# Patient Record
Sex: MALE | Race: WHITE | NOT HISPANIC OR LATINO
[De-identification: names, ages, dates, MRNs, and addresses within clinical notes are randomized per-mention and may not be internally consistent; named-entity substitution may affect disease eponyms.]

---

## 2019-11-25 ENCOUNTER — APPOINTMENT (OUTPATIENT)
Dept: CARDIOLOGY | Facility: CLINIC | Age: 59
End: 2019-11-25
Payer: COMMERCIAL

## 2019-11-25 PROCEDURE — 99214 OFFICE O/P EST MOD 30 MIN: CPT

## 2019-11-25 PROCEDURE — 93000 ELECTROCARDIOGRAM COMPLETE: CPT

## 2020-01-23 ENCOUNTER — APPOINTMENT (OUTPATIENT)
Dept: CARDIOLOGY | Facility: CLINIC | Age: 60
End: 2020-01-23

## 2020-02-03 ENCOUNTER — APPOINTMENT (OUTPATIENT)
Dept: CARDIOLOGY | Facility: CLINIC | Age: 60
End: 2020-02-03
Payer: COMMERCIAL

## 2020-02-03 PROCEDURE — 93306 TTE W/DOPPLER COMPLETE: CPT

## 2020-02-24 ENCOUNTER — APPOINTMENT (OUTPATIENT)
Dept: CARDIOLOGY | Facility: CLINIC | Age: 60
End: 2020-02-24
Payer: COMMERCIAL

## 2020-02-24 PROCEDURE — 93015 CV STRESS TEST SUPVJ I&R: CPT

## 2020-05-11 ENCOUNTER — OUTPATIENT (OUTPATIENT)
Dept: OUTPATIENT SERVICES | Facility: HOSPITAL | Age: 60
LOS: 1 days | Discharge: HOME | End: 2020-05-11
Payer: COMMERCIAL

## 2020-05-11 ENCOUNTER — RESULT REVIEW (OUTPATIENT)
Age: 60
End: 2020-05-11

## 2020-05-11 DIAGNOSIS — R07.9 CHEST PAIN, UNSPECIFIED: ICD-10-CM

## 2020-05-11 PROCEDURE — 78452 HT MUSCLE IMAGE SPECT MULT: CPT | Mod: 26

## 2020-05-19 ENCOUNTER — APPOINTMENT (OUTPATIENT)
Dept: CARDIOLOGY | Facility: CLINIC | Age: 60
End: 2020-05-19
Payer: COMMERCIAL

## 2020-05-19 PROCEDURE — 99213 OFFICE O/P EST LOW 20 MIN: CPT | Mod: 95

## 2020-09-29 ENCOUNTER — RECORD ABSTRACTING (OUTPATIENT)
Age: 60
End: 2020-09-29

## 2020-09-29 RX ORDER — ASPIRIN 81 MG
81 TABLET, DELAYED RELEASE (ENTERIC COATED) ORAL DAILY
Refills: 0 | Status: ACTIVE | COMMUNITY

## 2020-11-10 ENCOUNTER — APPOINTMENT (OUTPATIENT)
Dept: CARDIOLOGY | Facility: CLINIC | Age: 60
End: 2020-11-10
Payer: COMMERCIAL

## 2020-11-10 VITALS
BODY MASS INDEX: 26.14 KG/M2 | HEART RATE: 64 BPM | WEIGHT: 193 LBS | DIASTOLIC BLOOD PRESSURE: 60 MMHG | SYSTOLIC BLOOD PRESSURE: 110 MMHG | HEIGHT: 72 IN

## 2020-11-10 PROCEDURE — 99214 OFFICE O/P EST MOD 30 MIN: CPT

## 2020-11-10 PROCEDURE — 99072 ADDL SUPL MATRL&STAF TM PHE: CPT

## 2020-11-10 PROCEDURE — 93000 ELECTROCARDIOGRAM COMPLETE: CPT

## 2020-11-10 NOTE — DISCUSSION/SUMMARY
[FreeTextEntry1] : Patient was instructed to target his T. Cholesterol to less than 200 mg/dl and LDL cholesterol to less than 70 mg/dl.\par Exercise and weight loss was advised.\par Maintain cardiac medications. \par Patient was advised to repeat a BMP, CBC , fasting lipid profile and hepatic panel.\par Patient is at target goal with respect to his lab results.\par RV in 6 months.\par

## 2020-11-10 NOTE — PHYSICAL EXAM
[General Appearance - Well Developed] : well developed [Normal Appearance] : normal appearance [General Appearance - In No Acute Distress] : no acute distress [Normal Conjunctiva] : the conjunctiva exhibited no abnormalities [Normal Oropharynx] : normal oropharynx [Normal Jugular Venous V Waves Present] : normal jugular venous V waves present [Respiration, Rhythm And Depth] : normal respiratory rhythm and effort [Exaggerated Use Of Accessory Muscles For Inspiration] : no accessory muscle use [Heart Rate And Rhythm] : heart rate and rhythm were normal [Heart Sounds] : normal S1 and S2 [Murmurs] : no murmurs present [Arterial Pulses Normal] : the arterial pulses were normal [Edema] : no peripheral edema present [Abdomen Soft] : soft [Abdomen Tenderness] : non-tender [Abnormal Walk] : normal gait [Nail Clubbing] : no clubbing of the fingernails [Petechial Hemorrhages (___cm)] : no petechial hemorrhages [Skin Turgor] : normal skin turgor [] : no rash [Oriented To Time, Place, And Person] : oriented to person, place, and time [Affect] : the affect was normal

## 2021-03-17 RX ORDER — ENALAPRIL MALEATE 2.5 MG/1
2.5 TABLET ORAL DAILY
Refills: 0 | Status: DISCONTINUED | COMMUNITY
End: 2021-03-17

## 2021-05-13 ENCOUNTER — APPOINTMENT (OUTPATIENT)
Dept: CARDIOLOGY | Facility: CLINIC | Age: 61
End: 2021-05-13
Payer: COMMERCIAL

## 2021-05-13 VITALS
TEMPERATURE: 97.6 F | HEART RATE: 68 BPM | HEIGHT: 72 IN | BODY MASS INDEX: 25.73 KG/M2 | WEIGHT: 190 LBS | SYSTOLIC BLOOD PRESSURE: 110 MMHG | DIASTOLIC BLOOD PRESSURE: 70 MMHG

## 2021-05-13 PROCEDURE — 99214 OFFICE O/P EST MOD 30 MIN: CPT

## 2021-05-13 PROCEDURE — 99072 ADDL SUPL MATRL&STAF TM PHE: CPT

## 2021-05-13 PROCEDURE — 93000 ELECTROCARDIOGRAM COMPLETE: CPT

## 2021-05-13 RX ORDER — ATORVASTATIN CALCIUM 10 MG/1
10 TABLET, FILM COATED ORAL
Qty: 90 | Refills: 3 | Status: DISCONTINUED | COMMUNITY
Start: 1900-01-01 | End: 2021-05-13

## 2021-05-13 NOTE — PHYSICAL EXAM
[General Appearance - Well Developed] : well developed [Normal Appearance] : normal appearance [General Appearance - In No Acute Distress] : no acute distress [Normal Conjunctiva] : the conjunctiva exhibited no abnormalities [Normal Oropharynx] : normal oropharynx [Normal Jugular Venous V Waves Present] : normal jugular venous V waves present [Respiration, Rhythm And Depth] : normal respiratory rhythm and effort [Exaggerated Use Of Accessory Muscles For Inspiration] : no accessory muscle use [Heart Sounds] : normal S1 and S2 [Heart Rate And Rhythm] : heart rate and rhythm were normal [Murmurs] : no murmurs present [Arterial Pulses Normal] : the arterial pulses were normal [Edema] : no peripheral edema present [Abdomen Soft] : soft [Abdomen Tenderness] : non-tender [Abnormal Walk] : normal gait [Nail Clubbing] : no clubbing of the fingernails [Petechial Hemorrhages (___cm)] : no petechial hemorrhages [Skin Turgor] : normal skin turgor [] : no rash [Oriented To Time, Place, And Person] : oriented to person, place, and time [Affect] : the affect was normal

## 2021-05-14 NOTE — DISCUSSION/SUMMARY
[FreeTextEntry1] : Patient was instructed to target his T. Cholesterol to less than 200 mg/dl and LDL cholesterol to less than 70 mg/dl.\par He is at target goal with respect to his levels.\par Exercise and weight loss was advised.\par Maintain cardiac medications. \par Patient was advised to repeat a BMP, CBC , fasting lipid profile and hepatic panel.\par RV in 6 months.\par

## 2021-05-14 NOTE — REASON FOR VISIT
[FreeTextEntry1] : Patient presents for a follow up visit and review of his lab results. He denies any symptoms or cardiac complaints.

## 2021-11-11 ENCOUNTER — APPOINTMENT (OUTPATIENT)
Dept: CARDIOLOGY | Facility: CLINIC | Age: 61
End: 2021-11-11
Payer: COMMERCIAL

## 2021-11-11 PROCEDURE — 93000 ELECTROCARDIOGRAM COMPLETE: CPT

## 2021-11-11 PROCEDURE — 99214 OFFICE O/P EST MOD 30 MIN: CPT

## 2022-04-04 ENCOUNTER — RX RENEWAL (OUTPATIENT)
Age: 62
End: 2022-04-04

## 2022-05-19 ENCOUNTER — APPOINTMENT (OUTPATIENT)
Dept: CARDIOLOGY | Facility: CLINIC | Age: 62
End: 2022-05-19
Payer: COMMERCIAL

## 2022-05-19 VITALS
BODY MASS INDEX: 25.33 KG/M2 | TEMPERATURE: 96.2 F | WEIGHT: 187 LBS | SYSTOLIC BLOOD PRESSURE: 110 MMHG | HEART RATE: 59 BPM | DIASTOLIC BLOOD PRESSURE: 74 MMHG | HEIGHT: 72 IN

## 2022-05-19 DIAGNOSIS — Z00.00 ENCOUNTER FOR GENERAL ADULT MEDICAL EXAMINATION W/OUT ABNORMAL FINDINGS: ICD-10-CM

## 2022-05-19 PROCEDURE — 99214 OFFICE O/P EST MOD 30 MIN: CPT

## 2022-05-19 PROCEDURE — 93000 ELECTROCARDIOGRAM COMPLETE: CPT

## 2022-05-20 NOTE — REASON FOR VISIT
[FreeTextEntry1] : Patient presents for a follow up visit and to review his lab results. He denies any cardiac complaints.\par He continues to exercise and to watch his diet.

## 2022-05-20 NOTE — ASSESSMENT
[FreeTextEntry1] : Prior MI\par Hypertension\par Hyperlipidemia\par Stable cardiac examination in an asymptomatic patient

## 2022-07-02 ENCOUNTER — RX RENEWAL (OUTPATIENT)
Age: 62
End: 2022-07-02

## 2022-11-17 ENCOUNTER — APPOINTMENT (OUTPATIENT)
Dept: CARDIOLOGY | Facility: CLINIC | Age: 62
End: 2022-11-17

## 2022-11-17 VITALS
BODY MASS INDEX: 25.73 KG/M2 | DIASTOLIC BLOOD PRESSURE: 64 MMHG | SYSTOLIC BLOOD PRESSURE: 140 MMHG | HEART RATE: 65 BPM | WEIGHT: 190 LBS | HEIGHT: 72 IN

## 2022-11-17 PROCEDURE — 93000 ELECTROCARDIOGRAM COMPLETE: CPT

## 2022-11-17 PROCEDURE — 99214 OFFICE O/P EST MOD 30 MIN: CPT

## 2022-11-17 NOTE — PHYSICAL EXAM
[General Appearance - Well Developed] : well developed [Normal Appearance] : normal appearance [General Appearance - In No Acute Distress] : no acute distress [Normal Conjunctiva] : the conjunctiva exhibited no abnormalities [Normal Oropharynx] : normal oropharynx [Normal Jugular Venous V Waves Present] : normal jugular venous V waves present [Respiration, Rhythm And Depth] : normal respiratory rhythm and effort [Exaggerated Use Of Accessory Muscles For Inspiration] : no accessory muscle use [Heart Rate And Rhythm] : heart rate and rhythm were normal [Heart Sounds] : normal S1 and S2 [Murmurs] : no murmurs present [Arterial Pulses Normal] : the arterial pulses were normal [Edema] : no peripheral edema present [Abdomen Soft] : soft [Abdomen Tenderness] : non-tender [Abnormal Walk] : normal gait [Petechial Hemorrhages (___cm)] : no petechial hemorrhages [Nail Clubbing] : no clubbing of the fingernails [Skin Turgor] : normal skin turgor [] : no rash [Oriented To Time, Place, And Person] : oriented to person, place, and time [Affect] : the affect was normal

## 2022-11-18 NOTE — DISCUSSION/SUMMARY
[FreeTextEntry1] : Patient was instructed to target his T. Cholesterol to less than 200 mg/dl and LDL cholesterol to less than 70 mg/dl.\par He is at target goal with respect to his levels.\par Exercise and weight loss was advised.\par EKG:NSR,normal findings\par Maintain cardiac medications. \par Patient was advised to repeat a BMP, CBC , fasting lipid profile and hepatic panel.\par RV in 6 months.\par

## 2023-04-16 ENCOUNTER — RX RENEWAL (OUTPATIENT)
Age: 63
End: 2023-04-16

## 2023-04-16 RX ORDER — ENALAPRIL MALEATE 2.5 MG/1
2.5 TABLET ORAL
Qty: 180 | Refills: 3 | Status: ACTIVE | COMMUNITY
Start: 2020-11-10 | End: 1900-01-01

## 2023-06-29 ENCOUNTER — APPOINTMENT (OUTPATIENT)
Dept: CARDIOLOGY | Facility: CLINIC | Age: 63
End: 2023-06-29
Payer: COMMERCIAL

## 2023-06-29 ENCOUNTER — RESULT CHARGE (OUTPATIENT)
Age: 63
End: 2023-06-29

## 2023-06-29 VITALS
HEART RATE: 69 BPM | SYSTOLIC BLOOD PRESSURE: 118 MMHG | DIASTOLIC BLOOD PRESSURE: 78 MMHG | BODY MASS INDEX: 25.6 KG/M2 | HEIGHT: 72 IN | WEIGHT: 189 LBS

## 2023-06-29 PROCEDURE — 99214 OFFICE O/P EST MOD 30 MIN: CPT

## 2023-06-29 PROCEDURE — 93000 ELECTROCARDIOGRAM COMPLETE: CPT

## 2023-08-23 ENCOUNTER — EMERGENCY (EMERGENCY)
Facility: HOSPITAL | Age: 63
LOS: 0 days | Discharge: HOME | End: 2023-08-23
Attending: EMERGENCY MEDICINE
Payer: COMMERCIAL

## 2023-08-23 VITALS
DIASTOLIC BLOOD PRESSURE: 55 MMHG | WEIGHT: 197.09 LBS | SYSTOLIC BLOOD PRESSURE: 123 MMHG | HEART RATE: 70 BPM | TEMPERATURE: 98 F | RESPIRATION RATE: 20 BRPM | HEIGHT: 72 IN | OXYGEN SATURATION: 100 %

## 2023-08-23 DIAGNOSIS — Z79.82 LONG TERM (CURRENT) USE OF ASPIRIN: ICD-10-CM

## 2023-08-23 DIAGNOSIS — M79.89 OTHER SPECIFIED SOFT TISSUE DISORDERS: ICD-10-CM

## 2023-08-23 PROCEDURE — 93970 EXTREMITY STUDY: CPT

## 2023-08-23 PROCEDURE — 99284 EMERGENCY DEPT VISIT MOD MDM: CPT

## 2023-08-23 PROCEDURE — 99284 EMERGENCY DEPT VISIT MOD MDM: CPT | Mod: 25

## 2023-08-23 PROCEDURE — 93970 EXTREMITY STUDY: CPT | Mod: 26

## 2023-08-23 NOTE — ED PROVIDER NOTE - CLINICAL SUMMARY MEDICAL DECISION MAKING FREE TEXT BOX
Prelim duplex negative -- has no joint pain, ttp to suggest fracture, no warmth, redness to suggest cellulitis.     Advised patient on sx monitoring, need for repeat duplex in 1 week and PMD follow up. Discussed elevation, compression and low threshold for return if sx worsen or new sx develop.

## 2023-08-23 NOTE — ED ADULT NURSE NOTE - NSFALLUNIVINTERV_ED_ALL_ED
Bed/Stretcher in lowest position, wheels locked, appropriate side rails in place/Call bell, personal items and telephone in reach/Instruct patient to call for assistance before getting out of bed/chair/stretcher/Non-slip footwear applied when patient is off stretcher/[place] to call system/Physically safe environment - no spills, clutter or unnecessary equipment/Purposeful proactive rounding/Room/bathroom lighting operational, light cord in reach

## 2023-08-23 NOTE — ED PROVIDER NOTE - NSFOLLOWUPINSTRUCTIONS_ED_ALL_ED_FT
Please follow-up up with your primary doctor in 1-3 days and consider repeat sonogram in 1 week.     WHAT YOU NEED TO KNOW:    Leg edema is swelling caused by fluid buildup. Your legs may swell if you sit or stand for long periods of time, are pregnant, or are injured. Swelling may also occur if you have heart failure or circulation problems. This means that your heart does not pump blood through your body as it should.    DISCHARGE INSTRUCTIONS:    Self-care:     Elevate your legs: Raise your legs above the level of your heart as often as you can. This will help decrease swelling and pain. Prop your legs on pillows or blankets to keep them elevated comfortably.      Wear pressure stockings: These tight stockings put pressure on your legs to promote blood flow and prevent blood clots. Wear the stockings during the day. Do not wear them while you sleep.      Apply heat: Heat helps decrease pain and swelling. Apply heat on the area for 20 to 30 minutes every 2 hours for as many days as directed.       Stay active: Do not stand or sit for long periods of time. Ask your healthcare provider about the best exercise plan for you.      Eat healthy foods: Healthy foods include fruits, vegetables, whole-grain breads, low-fat dairy products, beans, lean meats, and fish. Ask if you need to be on a special diet. Limit salt. Salt will make your body hold even more fluid.    Follow up with your healthcare provider as directed: Write down your questions so you remember to ask them during your visits.     Contact your healthcare provider if:     You have a fever or feel more tired than usual.      The veins in your legs look larger than usual. They may look full or bulging.      Your legs itch or feel heavy.      You have red or white areas or sores on your legs. The skin may also appear dimpled or have indentations.      You are gaining weight.      You have trouble moving your ankles.      The swelling does not go away, or other parts of your body swell.      You have questions or concerns about your condition or care.    Return to the emergency department if:     You cannot walk.      You feel faint or confused.       Your skin turns blue or gray.      Your leg feels warm, tender, and painful. It may be swollen and red.      You have chest pain or trouble breathing that is worse when you lie down.      You suddenly feel lightheaded and have trouble breathing.      You have new and sudden chest pain. You may have more pain when you take deep breaths or cough. You may also cough up blood.

## 2023-08-23 NOTE — ED PROVIDER NOTE - PHYSICAL EXAMINATION
VITAL SIGNS: I have reviewed nursing notes and confirm.  CONSTITUTIONAL: Well-developed; well-nourished; in no acute distress.  SKIN: Skin exam is warm and dry, no acute rash, slight bruising to medial LLE proximal to ankle  HEAD: Normocephalic; atraumatic.  EYES: PERRL, EOM intact; conjunctiva and sclera clear.  ENT: No nasal discharge; airway clear.   CARD: S1, S2 normal; no murmurs, gallops, or rubs. Regular rate and rhythm.  RESP: No wheezes, rales or rhonchi.  ABD: Normal bowel sounds; soft; non-distended; non-tender; no hepatosplenomegaly.  EXT: Normal ROM. non pitting edema to L ankle, ttp over calf but no tense swelling to calf  NEURO: Alert, oriented. Grossly unremarkable. No focal deficits.  PSYCH: Cooperative, appropriate.

## 2023-08-23 NOTE — ED PROVIDER NOTE - OBJECTIVE STATEMENT
63 yo M, hx of MI at age 42, otherwise no medical history, on daily asa, lipitor, enalapril, here for assessment of LLE swelling, calf pain -- notes 2-3 days of progressive L leg swelling, ankle and lower calf, with associated calf pain.    No recent travel -- drives 1 hour each way to work. No Fh of premature CAD, PE/DVT. No recent trauma to leg.

## 2023-08-23 NOTE — ED PROVIDER NOTE - CARE PROVIDER_API CALL
Stanley Chowdary  Internal Medicine  [address] [place] [id number]  Phone: [phone]  Fax: [phone]  Follow Up Time: 4-6 Days

## 2023-08-23 NOTE — ED PROVIDER NOTE - PATIENT PORTAL LINK FT
You can access the FollowMyHealth Patient Portal offered by Health system by registering at the following website: [email or web address]. By joining [facility]’s FollowMyHealth portal, you will also be able to view your health information using other applications (apps) compatible with our system.

## 2023-08-23 NOTE — ED PROVIDER NOTE - CARE PROVIDERS DIRECT ADDRESSES
,[email or web address] no loss of consciousness, no gait abnormality, no headache, no sensory deficits, and no weakness.

## 2023-09-20 ENCOUNTER — RX RENEWAL (OUTPATIENT)
Age: 63
End: 2023-09-20

## 2023-09-20 RX ORDER — ATORVASTATIN CALCIUM 10 MG/1
10 TABLET, FILM COATED ORAL
Qty: 90 | Refills: 3 | Status: ACTIVE | COMMUNITY
Start: 2020-11-10 | End: 1900-01-01

## 2024-02-12 ENCOUNTER — APPOINTMENT (OUTPATIENT)
Dept: CARDIOLOGY | Facility: CLINIC | Age: 64
End: 2024-02-12
Payer: COMMERCIAL

## 2024-02-12 VITALS
WEIGHT: 186 LBS | BODY MASS INDEX: 25.19 KG/M2 | HEIGHT: 72 IN | DIASTOLIC BLOOD PRESSURE: 72 MMHG | HEART RATE: 67 BPM | SYSTOLIC BLOOD PRESSURE: 120 MMHG

## 2024-02-12 DIAGNOSIS — I21.9 ACUTE MYOCARDIAL INFARCTION, UNSPECIFIED: ICD-10-CM

## 2024-02-12 DIAGNOSIS — E78.5 HYPERLIPIDEMIA, UNSPECIFIED: ICD-10-CM

## 2024-02-12 DIAGNOSIS — I11.9 HYPERTENSIVE HEART DISEASE W/OUT HEART FAILURE: ICD-10-CM

## 2024-02-12 PROCEDURE — 93000 ELECTROCARDIOGRAM COMPLETE: CPT

## 2024-02-12 PROCEDURE — 99214 OFFICE O/P EST MOD 30 MIN: CPT

## 2024-02-12 NOTE — DISCUSSION/SUMMARY
[FreeTextEntry1] : Patient was instructed to target his T. Cholesterol to less than 200 mg/dl and LDL cholesterol to less than 70 mg/dl.\par  He is at target goal with respect to his levels.\par  Exercise and weight loss was advised.\par  EKG:NSR,normal findings\par  Maintain cardiac medications. \par  Patient was advised to repeat a BMP, CBC , fasting lipid profile and hepatic panel.\par  RV in 6 months.\par   [EKG obtained to assist in diagnosis and management of assessed problem(s)] : EKG obtained to assist in diagnosis and management of assessed problem(s)

## 2024-05-07 ENCOUNTER — APPOINTMENT (OUTPATIENT)
Dept: ORTHOPEDIC SURGERY | Facility: CLINIC | Age: 64
End: 2024-05-07
Payer: COMMERCIAL

## 2024-05-07 DIAGNOSIS — M16.11 UNILATERAL PRIMARY OSTEOARTHRITIS, RIGHT HIP: ICD-10-CM

## 2024-05-07 PROCEDURE — 99203 OFFICE O/P NEW LOW 30 MIN: CPT

## 2024-05-07 PROCEDURE — 73521 X-RAY EXAM HIPS BI 2 VIEWS: CPT

## 2024-05-07 NOTE — DISCUSSION/SUMMARY
[de-identified] : Discussed x-rays in detail with patient showing advanced osteoarthritis of right hip.  Discussed treatment options detail including conservative treatment versus surgical.  At this point patient has failed conservative treatment.  Discussed total hip replacement detail with patient.  Plan is for right total hip replacement  We discussed the surgery, including a description of the surgery in layman's terms, preoperative evaluation, the hospital stay, anesthesia, and expected outcome.     Models equivalent to the actual hip replacement prosthesis were used to demonstrate the magnitude and scope of the surgery.  Various types of implant fixation including cement and biologic fixation were described.  The patient shared in the decision making and agreed to the use of implants.    Additionally surgical risks were discussed.  The patient has good understanding of the inherent risks of surgery which include bleeding, pain, and infection, blood clots, and any medical issues that may arise in the perioperative period.  Additionally, we discussed risks uniquely pertinent to hip replacement surgery, including leg length discrepancy, instability, loosening of components, numbness around the incision, nerve palsy, and possible need for revision surgery should the replacement not function optimally.  All questions were answered and the patient verbalized understanding.  I encouraged the patient to contact me should any further questions are issues arise.      Patient will require a rolling walker 2-4 weeks post-op for ambulation and ADLs   Patient will also require commode as they are confined to one level without a bathroom   Patient was seen with Dr. Hubbard

## 2024-05-07 NOTE — HISTORY OF PRESENT ILLNESS
[de-identified] : Patient is a 63-year-old male here for evaluation of right hip.  Patient states he has been having right hip pain for couple years with no injuries or trauma.  Patient states that he was seen by another orthopedic was told he has osteoarthritis of right hip.  Patient states that he has tried physical therapy, cortisone injections to the hip which have not given him any relief.  Patient states last hip injection was about 1 year ago.  Patient states pain worsens activities.  Denies numbness/tingling

## 2024-05-07 NOTE — DATA REVIEWED
[FreeTextEntry1] : X-ray bilateral hips for comparison: No acute fractures, subluxations, dislocations.  Advanced osteoarthritis of right hip

## 2024-05-07 NOTE — IMAGING
[de-identified] : Right hip exam: No effusion, ecchymosis, erythema, no tenderness palpation, moderate decreased range of motion with pain to groin, positive impingement, neurovascular intact

## 2024-08-22 ENCOUNTER — APPOINTMENT (OUTPATIENT)
Dept: CARDIOLOGY | Facility: CLINIC | Age: 64
End: 2024-08-22
Payer: COMMERCIAL

## 2024-08-22 VITALS
HEIGHT: 72 IN | HEART RATE: 62 BPM | WEIGHT: 188 LBS | DIASTOLIC BLOOD PRESSURE: 74 MMHG | SYSTOLIC BLOOD PRESSURE: 110 MMHG | BODY MASS INDEX: 25.47 KG/M2

## 2024-08-22 DIAGNOSIS — I11.9 HYPERTENSIVE HEART DISEASE W/OUT HEART FAILURE: ICD-10-CM

## 2024-08-22 DIAGNOSIS — E78.5 HYPERLIPIDEMIA, UNSPECIFIED: ICD-10-CM

## 2024-08-22 PROCEDURE — 93000 ELECTROCARDIOGRAM COMPLETE: CPT

## 2024-08-22 PROCEDURE — 99214 OFFICE O/P EST MOD 30 MIN: CPT

## 2024-08-22 NOTE — DISCUSSION/SUMMARY
[FreeTextEntry1] : Lexiscan nuclear stress test TTE Patient was instructed to target his T. Cholesterol to less than 200 mg/dl and LDL cholesterol to less than 70 mg/dl. He is at target goal with respect to his levels. Exercise and weight loss was advised. EKG:NSR, normal findings Maintain cardiac medications.  Patient was advised to repeat a BMP, CBC , fasting lipid profile and hepatic panel. RV in 3 weeks.  [EKG obtained to assist in diagnosis and management of assessed problem(s)] : EKG obtained to assist in diagnosis and management of assessed problem(s)

## 2024-08-22 NOTE — REASON FOR VISIT
[FreeTextEntry1] : Patient presents for a follow up visit and to review his lab results. He denies any cardiac complaints. He continues to exercise and to watch his diet. He is planning on having a right hip replacement at [facility] in November, 2024.

## 2024-09-12 ENCOUNTER — OUTPATIENT (OUTPATIENT)
Dept: OUTPATIENT SERVICES | Facility: HOSPITAL | Age: 64
LOS: 1 days | End: 2024-09-12
Payer: COMMERCIAL

## 2024-09-12 ENCOUNTER — APPOINTMENT (OUTPATIENT)
Dept: CV DIAGNOSTICS | Facility: HOSPITAL | Age: 64
End: 2024-09-12
Payer: COMMERCIAL

## 2024-09-12 DIAGNOSIS — R07.9 CHEST PAIN, UNSPECIFIED: ICD-10-CM

## 2024-09-12 PROCEDURE — 93016 CV STRESS TEST SUPVJ ONLY: CPT

## 2024-09-12 PROCEDURE — A9500: CPT

## 2024-09-12 PROCEDURE — 93018 CV STRESS TEST I&R ONLY: CPT

## 2024-09-12 PROCEDURE — 93017 CV STRESS TEST TRACING ONLY: CPT

## 2024-09-12 PROCEDURE — 78452 HT MUSCLE IMAGE SPECT MULT: CPT | Mod: 26,MC

## 2024-09-12 PROCEDURE — 78452 HT MUSCLE IMAGE SPECT MULT: CPT | Mod: MC

## 2024-09-13 DIAGNOSIS — R07.9 CHEST PAIN, UNSPECIFIED: ICD-10-CM

## 2024-09-18 ENCOUNTER — APPOINTMENT (OUTPATIENT)
Dept: CARDIOLOGY | Facility: CLINIC | Age: 64
End: 2024-09-18
Payer: COMMERCIAL

## 2024-09-18 PROCEDURE — 93306 TTE W/DOPPLER COMPLETE: CPT

## 2024-10-07 ENCOUNTER — APPOINTMENT (OUTPATIENT)
Dept: CARDIOLOGY | Facility: CLINIC | Age: 64
End: 2024-10-07
Payer: COMMERCIAL

## 2024-10-07 VITALS
WEIGHT: 188 LBS | BODY MASS INDEX: 25.47 KG/M2 | DIASTOLIC BLOOD PRESSURE: 80 MMHG | HEIGHT: 72 IN | SYSTOLIC BLOOD PRESSURE: 122 MMHG

## 2024-10-07 DIAGNOSIS — I11.9 HYPERTENSIVE HEART DISEASE W/OUT HEART FAILURE: ICD-10-CM

## 2024-10-07 DIAGNOSIS — I21.9 ACUTE MYOCARDIAL INFARCTION, UNSPECIFIED: ICD-10-CM

## 2024-10-07 DIAGNOSIS — E78.5 HYPERLIPIDEMIA, UNSPECIFIED: ICD-10-CM

## 2024-10-07 PROCEDURE — 99214 OFFICE O/P EST MOD 30 MIN: CPT

## 2024-10-07 PROCEDURE — 93000 ELECTROCARDIOGRAM COMPLETE: CPT

## 2024-11-07 NOTE — ED PROVIDER NOTE - NS ED MD DISPO DISCHARGE CCDA
Writer irrigated patient's bilateral ears with warm water per Mendy Irvin NP's order.  Removed moderate amount of ear wax with minimal effort, total procedure lasting approx. 5 min.  Patient tolerated procedure well.  Able to see patient's tympanic membrane in bilateral ears after irrigation.  Patient stated able to hear from bilateral ears after irrigation.       Patient/Caregiver provided printed discharge information.

## 2025-02-01 ENCOUNTER — LABORATORY RESULT (OUTPATIENT)
Age: 65
End: 2025-02-01

## 2025-02-03 ENCOUNTER — NON-APPOINTMENT (OUTPATIENT)
Age: 65
End: 2025-02-03

## 2025-02-03 ENCOUNTER — APPOINTMENT (OUTPATIENT)
Dept: CARDIOLOGY | Facility: CLINIC | Age: 65
End: 2025-02-03
Payer: COMMERCIAL

## 2025-02-03 VITALS
BODY MASS INDEX: 25.87 KG/M2 | SYSTOLIC BLOOD PRESSURE: 120 MMHG | HEART RATE: 63 BPM | HEIGHT: 72 IN | DIASTOLIC BLOOD PRESSURE: 72 MMHG | WEIGHT: 191 LBS

## 2025-02-03 DIAGNOSIS — E78.5 HYPERLIPIDEMIA, UNSPECIFIED: ICD-10-CM

## 2025-02-03 DIAGNOSIS — I11.9 HYPERTENSIVE HEART DISEASE W/OUT HEART FAILURE: ICD-10-CM

## 2025-02-03 DIAGNOSIS — I21.9 ACUTE MYOCARDIAL INFARCTION, UNSPECIFIED: ICD-10-CM

## 2025-02-03 PROCEDURE — 93000 ELECTROCARDIOGRAM COMPLETE: CPT

## 2025-02-03 PROCEDURE — 99214 OFFICE O/P EST MOD 30 MIN: CPT

## 2025-09-11 ENCOUNTER — APPOINTMENT (OUTPATIENT)
Dept: CARDIOLOGY | Facility: CLINIC | Age: 65
End: 2025-09-11
Payer: COMMERCIAL

## 2025-09-11 VITALS
HEART RATE: 58 BPM | HEIGHT: 72 IN | DIASTOLIC BLOOD PRESSURE: 68 MMHG | WEIGHT: 189 LBS | SYSTOLIC BLOOD PRESSURE: 118 MMHG | BODY MASS INDEX: 25.6 KG/M2

## 2025-09-11 DIAGNOSIS — I11.9 HYPERTENSIVE HEART DISEASE W/OUT HEART FAILURE: ICD-10-CM

## 2025-09-11 DIAGNOSIS — I21.9 ACUTE MYOCARDIAL INFARCTION, UNSPECIFIED: ICD-10-CM

## 2025-09-11 DIAGNOSIS — E78.5 HYPERLIPIDEMIA, UNSPECIFIED: ICD-10-CM

## 2025-09-11 PROCEDURE — 99214 OFFICE O/P EST MOD 30 MIN: CPT

## 2025-09-11 PROCEDURE — 93000 ELECTROCARDIOGRAM COMPLETE: CPT
